# Patient Record
(demographics unavailable — no encounter records)

---

## 2024-10-31 NOTE — ASSESSMENT
[FreeTextEntry1] : 70 yo F w/ PMH HTN, osteoarthritis, HPV, spinal stenosis presenting to ID clinic as HFU.

## 2024-10-31 NOTE — HISTORY OF PRESENT ILLNESS
[FreeTextEntry1] : 70 yo F w/ PMH HTN, osteoarthritis, HPV, spinal stenosis presenting to ID clinic as HFU.  Initially admitted to [facility] from 9/22-->9/28 with hematuria, dysuria, fever, and chills, found to have E coli ESBL bacteremia and pyelonephritis, completed 10 days course of ertapenem.   Patient re-admitted to [facility] 10/20-->10/25, for L back pain and dysuria. UCx w/ E coli. CTAP revealed Interval improvement in enhancement pattern involving the right kidney since 9/22/2024. No drainable collection. Bladder wall thickening and perivesicular fatty infiltration also noted.  Seen by ID (Dr An). Patient was started on Ertapenem for 2 weeks total, with plan for outpatient f/up for repeat imaging.  Seen today in clinic